# Patient Record
Sex: MALE | Race: WHITE | Employment: FULL TIME | URBAN - METROPOLITAN AREA
[De-identification: names, ages, dates, MRNs, and addresses within clinical notes are randomized per-mention and may not be internally consistent; named-entity substitution may affect disease eponyms.]

---

## 2019-01-07 ENCOUNTER — TELEPHONE (OUTPATIENT)
Dept: CARDIOLOGY CLINIC | Age: 34
End: 2019-01-07

## 2019-01-07 NOTE — TELEPHONE ENCOUNTER
Called patient to see where we could request records. Verified patient's identity with two identifiers. Patient said he went to [facility] in [place]. He does not have a copy of EKG. I told him I would request records, but we may still have to repeat EKG. Patient verbalized understanding and denied further questions or concerns. Faxed records request.     1/8/19- No records received. Called for records and they were faxed and given to Dr. Julee Nguyen.

## 2019-01-08 ENCOUNTER — OFFICE VISIT (OUTPATIENT)
Dept: CARDIOLOGY CLINIC | Age: 34
End: 2019-01-08

## 2019-01-08 VITALS
RESPIRATION RATE: 16 BRPM | HEART RATE: 91 BPM | DIASTOLIC BLOOD PRESSURE: 78 MMHG | OXYGEN SATURATION: 97 % | SYSTOLIC BLOOD PRESSURE: 120 MMHG | HEIGHT: 73 IN | BODY MASS INDEX: 28.63 KG/M2 | WEIGHT: 216 LBS

## 2019-01-08 DIAGNOSIS — R94.31 ABNORMAL EKG: ICD-10-CM

## 2019-01-08 DIAGNOSIS — R07.89 OTHER CHEST PAIN: Primary | ICD-10-CM

## 2019-01-08 RX ORDER — CLONAZEPAM 0.5 MG/1
1 TABLET ORAL AS NEEDED
COMMUNITY
Start: 2019-01-07 | End: 2019-07-06

## 2019-01-08 NOTE — PROGRESS NOTES
HISTORY OF PRESENT ILLNESS Cris George is a 35 y.o. male SUMMARY:  
Problem List  Date Reviewed: 1/8/2019 Codes Class Noted Abnormal EKG ICD-10-CM: R94.31 
ICD-9-CM: 794.31  1/8/2019 Other chest pain ICD-10-CM: R07.89 ICD-9-CM: 786.59  1/8/2019 PTSD (post-traumatic stress disorder) ICD-10-CM: F43.10 ICD-9-CM: 309.81  12/2/2015 Current Outpatient Medications on File Prior to Visit Medication Sig  clonazePAM (KLONOPIN) 0.5 mg tablet Take 1 mg by mouth as needed.  omega-3 fatty acids-vitamin e (FISH OIL) 1,000 mg cap Take 1 Cap by mouth.  multivitamin (ONE A DAY) tablet Take 1 Tab by mouth daily. No current facility-administered medications on file prior to visit. CARDIOLOGY STUDIES TO DATE: 
None Chief Complaint Patient presents with  Chest Pain HPI : 
Mr. Hanane Silva is a 35year-old referred by his primary care physician for cardiac evaluation. A couple of years ago, he had some trouble with chest pains intermittently and ended up going to [facility] in [facility] and it sounds like he had a negative stress test.  For the last few months, he has had almost daily episodes of chest pain that occurs all over his chest in various places and randomly. He has also noticed some throat pain and difficulty swallowing, but has not been aware of any heartburn. The pain will last for five to ten minutes and does not radiate nor is it associated with any other symptoms. He saw his primary care physician about all of this and I have reviewed those notes, including an EKG, which showed normal sinus rhythm and left ventricular hypertrophy by alivia and no ST-T wave changes. There is no history of hypertension or diabetes. Cholesterol is uncertain. He smoked during college and family history is negative for premature coronary disease. He has occasional dizziness and occasional difficulty sleeping.    
 
 
 
CARDIAC ROS:  
 negative for dyspnea, palpitations, syncope, orthopnea, paroxysmal nocturnal dyspnea, claudication, lower extremity [place] Family History Problem Relation Age of Onset  No Known Problems Mother  No Known Problems Father  No Known Problems Sister  Heart Disease Paternal Grandfather   
     sudden death Past Medical History:  
Diagnosis Date  PTSD (post-traumatic stress disorder) GENERAL ROS: 
A comprehensive review of systems was negative except for that written in the HPI. Visit Vitals /78 (BP 1 Location: Left arm, BP Patient Position: Sitting) Pulse 91 Resp 16 Ht 6' 1\" (1.854 m) Wt 216 lb (98 kg) SpO2 97% BMI 28.50 kg/m² Wt Readings from Last 3 Encounters:  
01/08/19 216 lb (98 kg) 12/02/15 212 lb (96.2 kg) 08/19/15 200 lb (90.7 kg) BP Readings from Last 3 Encounters:  
01/08/19 120/78  
12/02/15 (!) 133/91  
08/19/15 102/64 PHYSICAL EXAM 
General appearance: alert, cooperative, no distress, appears stated age Neurologic: Alert and oriented X 3 Neck: supple, symmetrical, trachea midline, no adenopathy, no carotid bruit and no JVD Lungs: clear to auscultation bilaterally Heart: regular rate and rhythm, S1, S2 normal, no murmur, click, rub or gallop Abdomen: soft, non-tender. Bowel sounds normal. No masses,  no organomegaly Extremities: extremities normal, atraumatic, no cyanosis or edema Pulses: 2+ and symmetric Lab Results Component Value Date/Time Cholesterol, total 195 08/19/2015 10:39 AM  
 HDL Cholesterol 56 08/19/2015 10:39 AM  
 LDL, calculated 130 (H) 08/19/2015 10:39 AM  
 Triglyceride 45 08/19/2015 10:39 AM  
 
ASSESSMENT Mr. Mott's symptoms are somewhat atypical and I suspect it is going to mostly turn out to be GI. I suggested to him that he get some over-the-counter Tagamet, Zantac or Prilosec and try that for a week or ten days and see if that improves things.   Otherwise, he should see a specialist.  Because of the concerns about his left ventricular hypertrophy on EKG and his chest pain and his desire to start exercising again, I think we should further risk stratify him with a stress echocardiogram, so we will make those arrangements in the near future. current treatment plan is effective, no change in therapy 
lab results and schedule of future lab studies reviewed with patient Encounter Diagnoses Name Primary?  Other chest pain Yes  Abnormal EKG Orders Placed This Encounter  clonazePAM (KLONOPIN) 0.5 mg tablet Follow-up Disposition: 
Return in about 4 weeks (around 2/5/2019). Elizabeth Rogers MD1/8/2019

## 2019-01-08 NOTE — PROGRESS NOTES
1. Have you been to the ER, urgent care clinic since your last visit? Hospitalized since your last visit? No 
 
2. Have you seen or consulted any other health care providers outside of the [address] [place] [address] since your last visit? Include any pap smears or colon screening. No  
 
Pt reports Med Rec. Completed. Visit Vitals /78 (BP 1 Location: Left arm, BP Patient Position: Sitting) Pulse 91 Resp 16 Ht 6' 1\" (1.854 m) Wt 216 lb (98 kg) SpO2 97% BMI 28.50 kg/m²

## 2019-01-16 ENCOUNTER — CLINICAL SUPPORT (OUTPATIENT)
Dept: CARDIOLOGY CLINIC | Age: 34
End: 2019-01-16

## 2019-01-16 DIAGNOSIS — R07.89 OTHER CHEST PAIN: ICD-10-CM

## 2019-01-16 DIAGNOSIS — R94.31 ABNORMAL EKG: ICD-10-CM

## 2021-01-28 ENCOUNTER — TRANSCRIBE ORDER (OUTPATIENT)
Dept: SCHEDULING | Age: 36
End: 2021-01-28

## 2021-01-28 DIAGNOSIS — M77.12 LATERAL EPICONDYLITIS OF LEFT ELBOW: Primary | ICD-10-CM

## 2021-02-10 ENCOUNTER — HOSPITAL ENCOUNTER (OUTPATIENT)
Dept: MRI IMAGING | Age: 36
Discharge: HOME | End: 2021-02-10
Attending: ORTHOPAEDIC SURGERY
Payer: COMMERCIAL

## 2021-02-10 DIAGNOSIS — M77.12 LATERAL EPICONDYLITIS OF LEFT ELBOW: ICD-10-CM

## 2021-02-10 PROCEDURE — 73221 MRI JOINT UPR EXTREM W/O DYE: CPT

## 2021-06-30 ENCOUNTER — TRANSCRIBE ORDER (OUTPATIENT)
Dept: SCHEDULING | Age: 36
End: 2021-06-30

## 2021-06-30 DIAGNOSIS — M77.11 RIGHT LATERAL EPICONDYLITIS: Primary | ICD-10-CM

## 2022-03-18 PROBLEM — R07.89 OTHER CHEST PAIN: Status: ACTIVE | Noted: 2019-01-08

## 2022-03-18 PROBLEM — R94.31 ABNORMAL EKG: Status: ACTIVE | Noted: 2019-01-08
